# Patient Record
Sex: FEMALE | Race: WHITE | NOT HISPANIC OR LATINO | Employment: FULL TIME | URBAN - METROPOLITAN AREA
[De-identification: names, ages, dates, MRNs, and addresses within clinical notes are randomized per-mention and may not be internally consistent; named-entity substitution may affect disease eponyms.]

---

## 2023-12-21 ENCOUNTER — OFFICE VISIT (OUTPATIENT)
Dept: OBSTETRICS AND GYNECOLOGY | Facility: CLINIC | Age: 38
End: 2023-12-21
Payer: COMMERCIAL

## 2023-12-21 VITALS — WEIGHT: 154.8 LBS | SYSTOLIC BLOOD PRESSURE: 132 MMHG | DIASTOLIC BLOOD PRESSURE: 82 MMHG | BODY MASS INDEX: 28.78 KG/M2

## 2023-12-21 DIAGNOSIS — N93.9 ABNORMAL UTERINE BLEEDING (AUB): Primary | ICD-10-CM

## 2023-12-21 PROCEDURE — 99214 OFFICE O/P EST MOD 30 MIN: CPT | Performed by: OBSTETRICS & GYNECOLOGY

## 2023-12-21 RX ORDER — ETONOGESTREL 68 MG/1
IMPLANT SUBCUTANEOUS
COMMUNITY
Start: 2022-03-02

## 2023-12-21 ASSESSMENT — ENCOUNTER SYMPTOMS
OCCASIONAL FEELINGS OF UNSTEADINESS: 0
LOSS OF SENSATION IN FEET: 0
DEPRESSION: 0

## 2023-12-21 ASSESSMENT — PATIENT HEALTH QUESTIONNAIRE - PHQ9
2. FEELING DOWN, DEPRESSED OR HOPELESS: NOT AT ALL
1. LITTLE INTEREST OR PLEASURE IN DOING THINGS: NOT AT ALL
SUM OF ALL RESPONSES TO PHQ9 QUESTIONS 1 AND 2: 0

## 2023-12-21 ASSESSMENT — PAIN SCALES - GENERAL: PAINLEVEL: 0-NO PAIN

## 2023-12-21 NOTE — PROGRESS NOTES
Daylin Mcneal is a 38 y.o. female,  who presented with    Irregular bleeding     Blood clots  Cramping   More than 6 month     Breast cyst         Obstetrical History:  OB History          1    Para   0    Term                AB   1    Living   0         SAB        IAB        Ectopic   1    Multiple        Live Births                        Gynecological history:  Menarche: 11   years old   Periods Irregular menses            Contraceptive history:  Contraception:    Currently using nexplanon           Vaginal discharge    No  Menopausal symptoms No    Family history of breast cancer         Last Pap:  History of abnormal pap exams? Yes      History reviewed. No pertinent past medical history.  History reviewed. No pertinent surgical history.  No family history on file.  Social History     Tobacco Use    Smoking status: Every Day     Packs/day: 0.50     Years: 20.00     Additional pack years: 0.00     Total pack years: 10.00     Types: Cigarettes     Passive exposure: Current    Smokeless tobacco: Never   Vaping Use    Vaping Use: Never used   Substance Use Topics    Alcohol use: Yes    Drug use: Never     Social History     Tobacco Use   Smoking Status Every Day    Packs/day: 0.50    Years: 20.00    Additional pack years: 0.00    Total pack years: 10.00    Types: Cigarettes    Passive exposure: Current   Smokeless Tobacco Never       Current Outpatient Medications on File Prior to Visit   Medication Sig Dispense Refill    etonogestrel-eluting contraceptive (Nexplanon) 68 mg implant implant as directed Subcutaneous       No current facility-administered medications on file prior to visit.     No Known Allergies      REVIEW OF SYSTEMS:    General: No weight loss, malaise or fevers or other constitutional symptoms.  Neuro: No history of TIA's, stroke,.  No neurological symptoms or problems. No visual changes  Respiratory: No history of respiratory/pulmonary symptoms or  problems.  Cardiovascular: No history of cardiovascular symptoms or problems.  GI: No history of GI symptoms or problems.   : No history of UTI in past 6 weeks.  No history of  symptoms or problems.  BREASTS: No skin dimpling, nipple discharge or masses.  Endocrine: No history of diabetes. No Thyroid symptoms  Hematology: No history of bleeding or clotting disorder.  Skin: Negative for lesions, rash, and itching.      PHYSICAL EXAMINATION:    /82   Wt 70.2 kg (154 lb 12.8 oz)   LMP 2023 (Exact Date)   BMI 28.78 kg/m²   GENERAL: pleasant, female in no apparent distress  HEENT: Normocephalic, atraumatic, mucus membranes moist and no lesions  NEURO: alert and oriented x3,exam grossly non-focal  NECK: Supple, full range of motion, no adenopathy and thyroid normal  DERMATOLOGY: Normal, without lesions, non-icteric and non-hirsute  BREAST: soft, non-tender, symmetric, no dominant mass, normal nipple-areolar complex, no lymphadenopathy and no nipple discharge  CHEST: Normal inspiratory effort    ABDOMEN: soft, non-tender and no masses  PELVIC: external genitalia normal, normal Bartholin's glands, urethra, [place]'s glands, no vulvar lesions, no cervical lesions, good vaginal support, physiologic discharge present, normal appearing perineal body and perianal region  BIMANUAL: uterus normal size, shape and consistency, no adnexal masses and non-tender        ASSESSMENT and Plan:    Daylin Mcneal is a 38 y.o. female,  who  presented with AUB    Plan is  - I  ordered transvaginal ultrasound    - I will also do a hormonal panel    - Given family history odf Cancer will arrange end biopsy          Nora Mtz MD

## 2023-12-28 ENCOUNTER — HOSPITAL ENCOUNTER (OUTPATIENT)
Dept: RADIOLOGY | Facility: HOSPITAL | Age: 38
Discharge: HOME | End: 2023-12-28
Payer: COMMERCIAL

## 2023-12-28 ENCOUNTER — LAB (OUTPATIENT)
Dept: LAB | Facility: LAB | Age: 38
End: 2023-12-28
Payer: COMMERCIAL

## 2023-12-28 DIAGNOSIS — N93.9 ABNORMAL UTERINE BLEEDING (AUB): ICD-10-CM

## 2023-12-28 LAB
FSH SERPL-ACNC: 7.2 IU/L
HCG SERPL-ACNC: <1 MIU/ML
LH SERPL-ACNC: 5 IU/L
PROLACTIN SERPL-MCNC: 4.4 UG/L (ref 3–20)
TSH SERPL DL<=0.05 MIU/L-ACNC: 1.02 MIU/L (ref 0.27–4.2)

## 2023-12-28 PROCEDURE — 84146 ASSAY OF PROLACTIN: CPT

## 2023-12-28 PROCEDURE — 84443 ASSAY THYROID STIM HORMONE: CPT

## 2023-12-28 PROCEDURE — 83002 ASSAY OF GONADOTROPIN (LH): CPT

## 2023-12-28 PROCEDURE — 84702 CHORIONIC GONADOTROPIN TEST: CPT

## 2023-12-28 PROCEDURE — 36415 COLL VENOUS BLD VENIPUNCTURE: CPT

## 2023-12-28 PROCEDURE — 76856 US EXAM PELVIC COMPLETE: CPT

## 2023-12-28 PROCEDURE — 83001 ASSAY OF GONADOTROPIN (FSH): CPT

## 2024-01-10 ENCOUNTER — OFFICE VISIT (OUTPATIENT)
Dept: OBSTETRICS AND GYNECOLOGY | Facility: CLINIC | Age: 39
End: 2024-01-10
Payer: COMMERCIAL

## 2024-01-10 VITALS
HEIGHT: 62 IN | SYSTOLIC BLOOD PRESSURE: 121 MMHG | DIASTOLIC BLOOD PRESSURE: 82 MMHG | WEIGHT: 153.6 LBS | BODY MASS INDEX: 28.26 KG/M2

## 2024-01-10 DIAGNOSIS — D21.9 FIBROID: ICD-10-CM

## 2024-01-10 DIAGNOSIS — N93.9 ABNORMAL UTERINE BLEEDING (AUB): Primary | ICD-10-CM

## 2024-01-10 PROCEDURE — 99213 OFFICE O/P EST LOW 20 MIN: CPT | Performed by: OBSTETRICS & GYNECOLOGY

## 2024-01-10 ASSESSMENT — ENCOUNTER SYMPTOMS
LOSS OF SENSATION IN FEET: 0
OCCASIONAL FEELINGS OF UNSTEADINESS: 0
DEPRESSION: 0

## 2024-01-10 ASSESSMENT — PAIN SCALES - GENERAL: PAINLEVEL: 0-NO PAIN

## 2024-01-10 NOTE — PROGRESS NOTES
Daylin Mcneal is a 38 y.o. female,  who presented with    Irregular bleeding      Blood clots  Cramping   More than 6 month      Breast cyst     US shows Size: 7.8 cm x 3.7 cm x 7.0 cm. A 2.3 x 2.4 x 3.1 cm intramural  hypoechoic mass is seen within the uterine fundus. There is a 1.7 x  1.9 x 2.1 cm pedunculated hypoechoic solid mass arising from the  right side of the uterine fundus.      Obstetrical History:  OB History          1    Para   0    Term                AB   1    Living   0         SAB        IAB        Ectopic   1    Multiple        Live Births                           History reviewed. No pertinent past medical history.  History reviewed. No pertinent surgical history.  No family history on file.  Social History     Tobacco Use    Smoking status: Every Day     Packs/day: 0.50     Years: 20.00     Additional pack years: 0.00     Total pack years: 10.00     Types: Cigarettes     Passive exposure: Current    Smokeless tobacco: Never   Vaping Use    Vaping Use: Never used   Substance Use Topics    Alcohol use: Yes    Drug use: Never     Social History     Tobacco Use   Smoking Status Every Day    Packs/day: 0.50    Years: 20.00    Additional pack years: 0.00    Total pack years: 10.00    Types: Cigarettes    Passive exposure: Current   Smokeless Tobacco Never       Current Outpatient Medications on File Prior to Visit   Medication Sig Dispense Refill    etonogestrel-eluting contraceptive (Nexplanon) 68 mg implant implant as directed Subcutaneous       No current facility-administered medications on file prior to visit.     No Known Allergies      REVIEW OF SYSTEMS:    General: No weight loss, malaise or fevers or other constitutional symptoms.  Neuro: No history of TIA's, stroke,.  No neurological symptoms or problems. No visual changes  Respiratory: No history of respiratory/pulmonary symptoms or problems.  Cardiovascular: No history of cardiovascular symptoms or problems.  GI:  "No history of GI symptoms or problems.   : No history of UTI in past 6 weeks.  No history of  symptoms or problems.  BREASTS: No skin dimpling, nipple discharge or masses.  Endocrine: No history of diabetes. No Thyroid symptoms  Hematology: No history of bleeding or clotting disorder.  Skin: Negative for lesions, rash, and itching.      PHYSICAL EXAMINATION:    /82   Ht 1.57 m (5' 1.81\")   Wt 69.7 kg (153 lb 9.6 oz)   LMP 2023 (Exact Date)   BMI 28.27 kg/m²   GENERAL: pleasant, female in no apparent distress  HEENT: Normocephalic, atraumatic, mucus membranes moist and no lesions  NEURO: alert and oriented x3,exam grossly non-focal  NECK: Supple, full range of motion, no adenopathy and thyroid normal  DERMATOLOGY: Normal, without lesions, non-icteric and non-hirsute     ABDOMEN: soft, non-tender and no masses  PELVIC:  uterus normal size, shape and consistency, no adnexal masses and non-tender        ASSESSMENT and Plan:    Daylin Mcneal is a 38 y.o. female,  who  presented with AUB and possible fibroid   There is a 1.7 x  1.9 x 2.1 cm pedunculated hypoechoic solid mass arising from the  right side of the uterine fundus    I did recommend hysteroscopy and biopsy  Risk of perforation, pain, bleeding , injury discussed        Nora Mtz MD    "

## 2024-01-17 ENCOUNTER — PROCEDURE VISIT (OUTPATIENT)
Dept: OBSTETRICS AND GYNECOLOGY | Facility: CLINIC | Age: 39
End: 2024-01-17
Payer: COMMERCIAL

## 2024-01-17 VITALS
SYSTOLIC BLOOD PRESSURE: 124 MMHG | DIASTOLIC BLOOD PRESSURE: 84 MMHG | BODY MASS INDEX: 29.92 KG/M2 | WEIGHT: 158.5 LBS | HEIGHT: 61 IN

## 2024-01-17 DIAGNOSIS — N93.9 ABNORMAL UTERINE BLEEDING (AUB): ICD-10-CM

## 2024-01-17 LAB — PREGNANCY TEST URINE, POC: NEGATIVE

## 2024-01-17 PROCEDURE — 58558 HYSTEROSCOPY BIOPSY: CPT | Performed by: OBSTETRICS & GYNECOLOGY

## 2024-01-17 PROCEDURE — 88305 TISSUE EXAM BY PATHOLOGIST: CPT | Performed by: STUDENT IN AN ORGANIZED HEALTH CARE EDUCATION/TRAINING PROGRAM

## 2024-01-17 PROCEDURE — 88305 TISSUE EXAM BY PATHOLOGIST: CPT | Mod: TC,SUR,WESLAB | Performed by: OBSTETRICS & GYNECOLOGY

## 2024-01-17 ASSESSMENT — ENCOUNTER SYMPTOMS
DEPRESSION: 0
LOSS OF SENSATION IN FEET: 0
OCCASIONAL FEELINGS OF UNSTEADINESS: 0

## 2024-01-17 ASSESSMENT — PAIN SCALES - GENERAL: PAINLEVEL: 0-NO PAIN

## 2024-01-17 NOTE — PROGRESS NOTES
Hysteroscopy And Biopsy         Consent: Yes.          Prep: betadine.          Endocervix Canal normal.          Uterine Cavity normal uterine cavity. Indentation from fibroid anteriorly          Tubal Ostia seen bilaterally.          Endometrial Biopsy performed.          Sounding  8 cm.          Tolerance of Procedure: well.     Endometrial biopsy         Performed by: Nora Mtz MD  Authorized by: Nora Mtz MD    Consent:     Consent obtained: verbal    Consent given by: patient    Risks discussed: bleeding, damage to other organs, conversion to surgery, infection, need for repeat procedure, pain and loss of function    Alternatives discussed: alternative treatment    Patient agrees, verbalizes understanding, and wants to proceed: yes    Indications:     Indications: abnormal uterine bleeding          Chronicity of post-coital bleeding: recurrent    Progression of post-coital bleeding: worsening  Pre-procedure:     Urine pregnancy test: negative      Premeds: acetaminophen    Procedure:     A bimanual exam was performed: yes      Uterus size: 6-8 weeks    Uterus position: anteverted    Prepped with: Betadine    Tenaculum used: yes      A local block was performed: no      Local anesthetic: none    Cervix dilated: no      Number of passes: 3  Findings:     Cervix: normal      Uterus depth by sound (cm): 7    Specimen collected: specimen collected and sent to pathology      Patient tolerance: tolerated well, no immediate complications

## 2024-01-23 LAB
LABORATORY COMMENT REPORT: NORMAL
PATH REPORT.FINAL DX SPEC: NORMAL
PATH REPORT.GROSS SPEC: NORMAL
PATH REPORT.TOTAL CANCER: NORMAL

## 2024-07-11 ENCOUNTER — OFFICE VISIT (OUTPATIENT)
Dept: OBSTETRICS AND GYNECOLOGY | Facility: CLINIC | Age: 39
End: 2024-07-11
Payer: COMMERCIAL

## 2024-07-11 VITALS
BODY MASS INDEX: 29.42 KG/M2 | WEIGHT: 155.8 LBS | HEIGHT: 61 IN | SYSTOLIC BLOOD PRESSURE: 110 MMHG | DIASTOLIC BLOOD PRESSURE: 81 MMHG

## 2024-07-11 DIAGNOSIS — Z01.411 ENCOUNTER FOR GYNECOLOGICAL EXAMINATION WITH ABNORMAL FINDING: Primary | ICD-10-CM

## 2024-07-11 DIAGNOSIS — N93.9 ABNORMAL UTERINE BLEEDING (AUB): ICD-10-CM

## 2024-07-11 PROCEDURE — 87491 CHLMYD TRACH DNA AMP PROBE: CPT | Performed by: OBSTETRICS & GYNECOLOGY

## 2024-07-11 PROCEDURE — 87661 TRICHOMONAS VAGINALIS AMPLIF: CPT | Performed by: OBSTETRICS & GYNECOLOGY

## 2024-07-11 PROCEDURE — 99396 PREV VISIT EST AGE 40-64: CPT | Performed by: OBSTETRICS & GYNECOLOGY

## 2024-07-11 ASSESSMENT — PATIENT HEALTH QUESTIONNAIRE - PHQ9
SUM OF ALL RESPONSES TO PHQ9 QUESTIONS 1 AND 2: 0
1. LITTLE INTEREST OR PLEASURE IN DOING THINGS: NOT AT ALL
2. FEELING DOWN, DEPRESSED OR HOPELESS: NOT AT ALL

## 2024-07-11 ASSESSMENT — ENCOUNTER SYMPTOMS
OCCASIONAL FEELINGS OF UNSTEADINESS: 0
DEPRESSION: 0
LOSS OF SENSATION IN FEET: 0

## 2024-07-11 ASSESSMENT — PAIN SCALES - GENERAL: PAINLEVEL: 0-NO PAIN

## 2024-07-11 NOTE — PROGRESS NOTES
"Daylin Mcneal is a 39 y.o.  who presents for her annual gynecologic exam     Complains: Aub  Previous bx was neg  US : polyp/ fibroid       Has nexplanon        History of abnormal pap: yes        OB History          1    Para   0    Term                AB   1    Living   0         SAB        IAB        Ectopic   1    Multiple        Live Births                   Past Medical History:   Diagnosis Date    HPV (human papilloma virus) infection 2023     History reviewed. No pertinent surgical history.  No family history on file.  Social History     Tobacco Use    Smoking status: Every Day     Current packs/day: 0.50     Average packs/day: 0.5 packs/day for 15.0 years (7.5 ttl pk-yrs)     Types: Cigarettes     Passive exposure: Current    Smokeless tobacco: Never   Vaping Use    Vaping status: Never Used   Substance Use Topics    Alcohol use: Not Currently    Drug use: Never           REVIEW OF SYSTEMS  Abdomen: No abdominal pain, nausea, vomiting, diarrhea, or constipation.   No bloating, early satiety, indigestion, or increased flatulence.  Bladder: No dysuria, gross hematuria, urinary frequency, urinary urgency, or incontinence.  Breast: No breast lumps, nipple d/c, overlying skin changes, redness or skin retraction.  Allergies and current medication updated:Yes        EXAM:  /81   Ht 1.549 m (5' 1\")   Wt 70.7 kg (155 lb 12.8 oz)   LMP 2024 (Exact Date) Comment: pt. also has Nexplanon.  BMI 29.44 kg/m²   GENERAL: pleasant, female in no apparent distress  HEENT: Normocephalic, atraumatic, mucus membranes moist and no lesions  NECK: Supple, full range of motion, no adenopathy and thyroid normal  DERMATOLOGY: Normal, without lesions, non-icteric and non-hirsute  BREAST: soft, non-tender, symmetric, no dominant mass, normal nipple-areolar complex, no lymphadenopathy and no nipple discharge  CHEST: Normal inspiratory effort  ABDOMEN: soft, non-tender and no masses  PELVIC: " external genitalia normal, normal Bartholin's glands, urethra, [place]'s glands, no vulvar lesions, no cervical lesions, good vaginal support, physiologic discharge present, normal appearing perineal body and perianal region  BIMANUAL: uterus normal size, shape and consistency, no adnexal masses and non-tender  RECTOVAGINAL: rectovaginal exam negative for any masses or nodularity.  NEURO: alert and oriented x3,exam grossly non-focal  EXTREMITIES: normal        ASSESSMENT:  Healthy female  AUB: discussed repeat US     PLAN:  Education reviewed and Recommended: Safe Sex/STD Prevention, Smoking Cessation, Self Breast Exams, Calcium Supplements, Weight Bearing Exercise, Low Fat, and Low  Cholesterol Diet, Skin Cancer Screening, Depression Evaluation,    Contraception: OCP (estrogen/progesterone).     Repeat Pap every 3-5 years if negative, yearly if history of abnormal Pap or cervical cancer.  HPV typing discussed with patient. No history of VIDYA exposure. New Pap smear recommendations discussed.  STD counseling and prevention reviewed. Condom use encouraged.  HPV vaccine completed.    on.  Physical activity discussed.    Routine Health Maintenance through patient PCP  Follow up one year and as needed.  .     Nora Mtz MD           This note was produced with voice recognition software and may contain errors in grammar, spelling and content.  If any questions, please feel free to contact me.     I was accompanied by Female Chaperone, LPN  during the exam

## 2024-07-13 LAB
C TRACH RRNA SPEC QL NAA+PROBE: NEGATIVE
N GONORRHOEA DNA SPEC QL PROBE+SIG AMP: NEGATIVE
T VAGINALIS RRNA SPEC QL NAA+PROBE: NEGATIVE

## 2024-07-18 ENCOUNTER — HOSPITAL ENCOUNTER (OUTPATIENT)
Dept: RADIOLOGY | Facility: HOSPITAL | Age: 39
Discharge: HOME | End: 2024-07-18
Payer: COMMERCIAL

## 2024-07-18 VITALS — HEIGHT: 61 IN | BODY MASS INDEX: 29.27 KG/M2 | WEIGHT: 155 LBS

## 2024-07-18 DIAGNOSIS — Z01.411 ENCOUNTER FOR GYNECOLOGICAL EXAMINATION WITH ABNORMAL FINDING: ICD-10-CM

## 2024-07-18 DIAGNOSIS — N93.9 ABNORMAL UTERINE BLEEDING (AUB): ICD-10-CM

## 2024-07-18 PROCEDURE — 76830 TRANSVAGINAL US NON-OB: CPT

## 2024-07-18 PROCEDURE — 77063 BREAST TOMOSYNTHESIS BI: CPT

## 2024-07-22 LAB
CYTOLOGY CMNT CVX/VAG CYTO-IMP: NORMAL
HPV HR 12 DNA GENITAL QL NAA+PROBE: NEGATIVE
HPV HR GENOTYPES PNL CVX NAA+PROBE: NEGATIVE
HPV16 DNA SPEC QL NAA+PROBE: NEGATIVE
HPV18 DNA SPEC QL NAA+PROBE: NEGATIVE
LAB AP HPV GENOTYPE QUESTION: YES
LAB AP HPV HR: NORMAL
LAB AP PAP ADDITIONAL TESTS: NORMAL
LABORATORY COMMENT REPORT: NORMAL
LMP START DATE: NORMAL
PATH REPORT.TOTAL CANCER: NORMAL

## 2024-08-21 ENCOUNTER — APPOINTMENT (OUTPATIENT)
Dept: OBSTETRICS AND GYNECOLOGY | Facility: CLINIC | Age: 39
End: 2024-08-21
Payer: COMMERCIAL

## 2024-09-05 ENCOUNTER — HOSPITAL ENCOUNTER (OUTPATIENT)
Facility: HOSPITAL | Age: 39
Setting detail: OUTPATIENT SURGERY
End: 2024-09-05
Attending: OBSTETRICS & GYNECOLOGY | Admitting: OBSTETRICS & GYNECOLOGY
Payer: COMMERCIAL

## 2024-09-05 ENCOUNTER — PREP FOR PROCEDURE (OUTPATIENT)
Dept: OBSTETRICS AND GYNECOLOGY | Facility: CLINIC | Age: 39
End: 2024-09-05

## 2024-09-05 ENCOUNTER — OFFICE VISIT (OUTPATIENT)
Dept: OBSTETRICS AND GYNECOLOGY | Facility: CLINIC | Age: 39
End: 2024-09-05
Payer: COMMERCIAL

## 2024-09-05 VITALS
HEIGHT: 61 IN | DIASTOLIC BLOOD PRESSURE: 92 MMHG | SYSTOLIC BLOOD PRESSURE: 120 MMHG | BODY MASS INDEX: 29.74 KG/M2 | WEIGHT: 157.5 LBS

## 2024-09-05 DIAGNOSIS — D21.9 FIBROID: Primary | ICD-10-CM

## 2024-09-05 DIAGNOSIS — N93.9 ABNORMAL UTERINE BLEEDING (AUB): ICD-10-CM

## 2024-09-05 PROCEDURE — 99214 OFFICE O/P EST MOD 30 MIN: CPT | Performed by: OBSTETRICS & GYNECOLOGY

## 2024-09-05 PROCEDURE — 3008F BODY MASS INDEX DOCD: CPT | Performed by: OBSTETRICS & GYNECOLOGY

## 2024-09-05 ASSESSMENT — ENCOUNTER SYMPTOMS
DEPRESSION: 0
LOSS OF SENSATION IN FEET: 0
OCCASIONAL FEELINGS OF UNSTEADINESS: 0

## 2024-09-05 ASSESSMENT — PATIENT HEALTH QUESTIONNAIRE - PHQ9
1. LITTLE INTEREST OR PLEASURE IN DOING THINGS: NOT AT ALL
2. FEELING DOWN, DEPRESSED OR HOPELESS: NOT AT ALL
SUM OF ALL RESPONSES TO PHQ9 QUESTIONS 1 AND 2: 0

## 2024-09-05 ASSESSMENT — PAIN SCALES - GENERAL: PAINLEVEL: 0-NO PAIN

## 2024-09-05 NOTE — PROGRESS NOTES
Daylin Mcneal is a 39 y.o. female,  who presented with  AUB     Irregular   Cramping  Pressure   Blood clots   Pelvic and back pain      Endometrial biopsy : neg     Failed Nexplanon       Obstetrical History:  OB History          1    Para   0    Term                AB   1    Living   0         SAB        IAB        Ectopic   1    Multiple        Live Births                        Gynecological history:  Menarche: 11   years old   Periods irregular            Vaginal discharge    No  Menopausal symptoms No        Last Pap:  History of abnormal pap exams? Yes      Past Medical History:   Diagnosis Date    HPV (human papilloma virus) infection 2023     History reviewed. No pertinent surgical history.  No family history on file.  Social History     Tobacco Use    Smoking status: Every Day     Current packs/day: 0.50     Average packs/day: 0.5 packs/day for 15.0 years (7.5 ttl pk-yrs)     Types: Cigarettes     Passive exposure: Current    Smokeless tobacco: Never   Vaping Use    Vaping status: Never Used   Substance Use Topics    Alcohol use: Not Currently    Drug use: Never     Social History     Tobacco Use   Smoking Status Every Day    Current packs/day: 0.50    Average packs/day: 0.5 packs/day for 15.0 years (7.5 ttl pk-yrs)    Types: Cigarettes    Passive exposure: Current   Smokeless Tobacco Never       Current Outpatient Medications on File Prior to Visit   Medication Sig Dispense Refill    etonogestrel-eluting contraceptive (Nexplanon) 68 mg implant implant as directed Subcutaneous       No current facility-administered medications on file prior to visit.     No Known Allergies      REVIEW OF SYSTEMS:    General: No weight loss, malaise or fevers or other constitutional symptoms.  Neuro: No history of TIA's, stroke,.  No neurological symptoms or problems. No visual changes  Respiratory: No history of respiratory/pulmonary symptoms or problems.  Cardiovascular: No history of  "cardiovascular symptoms or problems.  GI: No history of GI symptoms or problems.   : No history of UTI in past 6 weeks.  No history of  symptoms or problems.  BREASTS: No skin dimpling, nipple discharge or masses.  Endocrine: No history of diabetes. No Thyroid symptoms  Hematology: No history of bleeding or clotting disorder.  Skin: Negative for lesions, rash, and itching.      PHYSICAL EXAMINATION:    BP (!) 120/92   Ht 1.549 m (5' 1\")   Wt 71.4 kg (157 lb 8 oz)   LMP  (LMP Unknown)   BMI 29.76 kg/m²   GENERAL: pleasant, female in no apparent distress  HEENT: Normocephalic, atraumatic, mucus membranes moist and no lesions  NEURO: alert and oriented x3,exam grossly non-focal  NECK: Supple, full range of motion, no adenopathy and thyroid normal  DERMATOLOGY: Normal, without lesions, non-icteric and non-hirsute  BREAST: soft, non-tender, symmetric, no dominant mass, normal nipple-areolar complex, no lymphadenopathy and no nipple discharge  CHEST: Normal inspiratory effort         ASSESSMENT and Plan:    Daylin Mcneal is a 39 y.o. female,  who  presented with AUB  Failed medical management     Informed consent for  Hysteroscopy D&C  myomectomy and endometrial ablation     The following risk were reviewed with the patient including, but not limited to, the risk of bleeding, infection, uterine perforation, chronic pain, fistula formation, urethritis, injury to other organs that may require additional surgery, future surgery to be performed by another surgeon or specialist,   The patient understands that there are alternative including but not limited to having surgery, medical management.  The patient accepts the above risk and desires to proceed with surgery    Nora Mtz MD    "

## 2024-10-06 ENCOUNTER — OFFICE VISIT (OUTPATIENT)
Dept: URGENT CARE | Age: 39
End: 2024-10-06
Payer: COMMERCIAL

## 2024-10-06 VITALS
SYSTOLIC BLOOD PRESSURE: 139 MMHG | WEIGHT: 160 LBS | DIASTOLIC BLOOD PRESSURE: 86 MMHG | HEIGHT: 61 IN | HEART RATE: 78 BPM | OXYGEN SATURATION: 95 % | RESPIRATION RATE: 18 BRPM | BODY MASS INDEX: 30.21 KG/M2 | TEMPERATURE: 98.2 F
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
## 2024-10-06 DIAGNOSIS — S50.862A INSECT BITE OF LEFT FOREARM, INITIAL ENCOUNTER: Primary | ICD-10-CM

## 2024-10-06 PROCEDURE — 99202 OFFICE O/P NEW SF 15 MIN: CPT | Performed by: PHYSICIAN ASSISTANT

## 2024-10-06 PROCEDURE — 3008F BODY MASS INDEX DOCD: CPT | Performed by: PHYSICIAN ASSISTANT

## 2024-10-06 RX ORDER — TRIAMCINOLONE ACETONIDE 1 MG/G
CREAM TOPICAL
Qty: 15 G | Refills: 0 | Status: SHIPPED | OUTPATIENT
Start: 2024-10-06

## 2024-10-06 NOTE — PROGRESS NOTES
"Subjective   Patient ID: Daylin Mcneal is a 39 y.o. female. They present today with a chief complaint of Other (Bug bites on lt arm).    History of Present Illness  Two bites insect bites on right forearm happened 1hr PTA when she was outside     Denies fever, chills, sweats, nausea, vomiting.           Past Medical History  Allergies as of 10/06/2024    (No Known Allergies)       (Not in a hospital admission)       Past Medical History:   Diagnosis Date    HPV (human papilloma virus) infection March 2023       History reviewed. No pertinent surgical history.     reports that she has been smoking cigarettes. She has a 7.5 pack-year smoking history. She has been exposed to tobacco smoke. She has never used smokeless tobacco. She reports that she does not currently use alcohol. She reports that she does not use drugs.    Review of Systems  Review of Systems   Constitutional:  Negative for chills, diaphoresis and fever.   Respiratory:  Negative for shortness of breath.    Cardiovascular:  Negative for chest pain.   Skin:  Positive for color change.   All other systems reviewed and are negative.                                 Objective    Vitals:    10/06/24 1845   BP: 139/86   BP Location: Left arm   Patient Position: Sitting   BP Cuff Size: Adult   Pulse: 78   Resp: 18   Temp: 36.8 °C (98.2 °F)   SpO2: 95%   Weight: 72.6 kg (160 lb)   Height: 1.537 m (5' 0.5\")     No LMP recorded.    Physical Exam  Vitals and nursing note reviewed.   Constitutional:       General: She is not in acute distress.  Skin:     Findings: Erythema present.      Comments: Two papules left forearm with central dot, mild erythema. No cellulitis.    Neurological:      Mental Status: She is alert.         Procedures    Point of Care Test & Imaging Results from this visit  No results found for this visit on 10/06/24.   No results found.    Diagnostic study results (if any) were reviewed by Desi Wheeler PA-C.    Assessment/Plan   Allergies, " medications, history, and pertinent labs/EKGs/Imaging reviewed by Desi Wheeler PA-C.     Orders and Diagnoses  There are no diagnoses linked to this encounter.    Medical Admin Record      Patient disposition: Home    Electronically signed by Desi Wheeler PA-C  6:56 PM

## 2024-10-08 ASSESSMENT — ENCOUNTER SYMPTOMS
DIAPHORESIS: 0
COLOR CHANGE: 1
CHILLS: 0
FEVER: 0
SHORTNESS OF BREATH: 0

## 2024-10-08 NOTE — PATIENT INSTRUCTIONS
take antihistamine during the day, ex zyrtec, claritin, etc., for itching/swelling    take benadryl at night, for itching/swelling    use cool compresses.    Wash twice a day, dry well, apply triamcinilone.    follow up with primary care if no improvement in 3 - 4 days.

## 2024-10-31 ENCOUNTER — TELEPHONE (OUTPATIENT)
Dept: OBSTETRICS AND GYNECOLOGY | Facility: CLINIC | Age: 39
End: 2024-10-31
Payer: COMMERCIAL

## 2024-11-06 ENCOUNTER — APPOINTMENT (OUTPATIENT)
Dept: PREADMISSION TESTING | Facility: HOSPITAL | Age: 39
End: 2024-11-06
Payer: COMMERCIAL

## 2024-11-07 ENCOUNTER — APPOINTMENT (OUTPATIENT)
Dept: OBSTETRICS AND GYNECOLOGY | Facility: CLINIC | Age: 39
End: 2024-11-07
Payer: COMMERCIAL

## 2024-12-03 ENCOUNTER — APPOINTMENT (OUTPATIENT)
Dept: OBSTETRICS AND GYNECOLOGY | Facility: CLINIC | Age: 39
End: 2024-12-03
Payer: COMMERCIAL